# Patient Record
Sex: FEMALE | Race: BLACK OR AFRICAN AMERICAN | NOT HISPANIC OR LATINO | Employment: FULL TIME | URBAN - METROPOLITAN AREA
[De-identification: names, ages, dates, MRNs, and addresses within clinical notes are randomized per-mention and may not be internally consistent; named-entity substitution may affect disease eponyms.]

---

## 2017-08-11 ENCOUNTER — HOSPITAL ENCOUNTER (EMERGENCY)
Facility: HOSPITAL | Age: 27
Discharge: HOME | End: 2017-08-11
Attending: EMERGENCY MEDICINE
Payer: MEDICAID

## 2017-08-11 VITALS
DIASTOLIC BLOOD PRESSURE: 84 MMHG | WEIGHT: 218 LBS | TEMPERATURE: 98 F | HEIGHT: 65 IN | RESPIRATION RATE: 20 BRPM | HEART RATE: 80 BPM | SYSTOLIC BLOOD PRESSURE: 141 MMHG | BODY MASS INDEX: 36.32 KG/M2 | OXYGEN SATURATION: 98 %

## 2017-08-11 DIAGNOSIS — R10.9 LEFT SIDED ABDOMINAL PAIN: ICD-10-CM

## 2017-08-11 DIAGNOSIS — N20.0 NEPHROLITHIASIS: Primary | ICD-10-CM

## 2017-08-11 DIAGNOSIS — N13.30 HYDROURETERONEPHROSIS: ICD-10-CM

## 2017-08-11 DIAGNOSIS — R10.9 LEFT FLANK PAIN: ICD-10-CM

## 2017-08-11 LAB
ALBUMIN SERPL BCP-MCNC: 3.8 G/DL
ALP SERPL-CCNC: 89 U/L
ALT SERPL W/O P-5'-P-CCNC: 12 U/L
ANION GAP SERPL CALC-SCNC: 10 MMOL/L
AST SERPL-CCNC: 13 U/L
B-HCG UR QL: NEGATIVE
BACTERIA #/AREA URNS HPF: NORMAL /HPF
BASOPHILS # BLD AUTO: 0.01 K/UL
BASOPHILS NFR BLD: 0.1 %
BILIRUB SERPL-MCNC: 0.7 MG/DL
BILIRUB UR QL STRIP: NEGATIVE
BUN SERPL-MCNC: 9 MG/DL
CALCIUM SERPL-MCNC: 10.1 MG/DL
CHLORIDE SERPL-SCNC: 105 MMOL/L
CLARITY UR: ABNORMAL
CO2 SERPL-SCNC: 26 MMOL/L
COLOR UR: YELLOW
CREAT SERPL-MCNC: 1 MG/DL
CTP QC/QA: YES
DIFFERENTIAL METHOD: ABNORMAL
EOSINOPHIL # BLD AUTO: 0.1 K/UL
EOSINOPHIL NFR BLD: 0.6 %
ERYTHROCYTE [DISTWIDTH] IN BLOOD BY AUTOMATED COUNT: 12.9 %
EST. GFR  (AFRICAN AMERICAN): >60 ML/MIN/1.73 M^2
EST. GFR  (NON AFRICAN AMERICAN): >60 ML/MIN/1.73 M^2
GLUCOSE SERPL-MCNC: 111 MG/DL
GLUCOSE UR QL STRIP: NEGATIVE
HCT VFR BLD AUTO: 42.5 %
HGB BLD-MCNC: 13.5 G/DL
HGB UR QL STRIP: NEGATIVE
HYALINE CASTS #/AREA URNS LPF: 0 /LPF
KETONES UR QL STRIP: NEGATIVE
LEUKOCYTE ESTERASE UR QL STRIP: ABNORMAL
LYMPHOCYTES # BLD AUTO: 1.5 K/UL
LYMPHOCYTES NFR BLD: 13.6 %
MCH RBC QN AUTO: 27 PG
MCHC RBC AUTO-ENTMCNC: 31.8 G/DL
MCV RBC AUTO: 85 FL
MICROSCOPIC COMMENT: NORMAL
MONOCYTES # BLD AUTO: 0.7 K/UL
MONOCYTES NFR BLD: 6.1 %
NEUTROPHILS # BLD AUTO: 8.8 K/UL
NEUTROPHILS NFR BLD: 79.6 %
NITRITE UR QL STRIP: NEGATIVE
PH UR STRIP: 5 [PH] (ref 5–8)
PLATELET # BLD AUTO: 309 K/UL
PMV BLD AUTO: 10.8 FL
POTASSIUM SERPL-SCNC: 4 MMOL/L
PROT SERPL-MCNC: 7.6 G/DL
PROT UR QL STRIP: ABNORMAL
RBC # BLD AUTO: 5 M/UL
RBC #/AREA URNS HPF: 1 /HPF (ref 0–4)
SODIUM SERPL-SCNC: 141 MMOL/L
SP GR UR STRIP: 1.02 (ref 1–1.03)
SQUAMOUS #/AREA URNS HPF: 3 /HPF
URN SPEC COLLECT METH UR: ABNORMAL
UROBILINOGEN UR STRIP-ACNC: NEGATIVE EU/DL
WBC # BLD AUTO: 11.06 K/UL
WBC #/AREA URNS HPF: 1 /HPF (ref 0–5)

## 2017-08-11 PROCEDURE — 85025 COMPLETE CBC W/AUTO DIFF WBC: CPT

## 2017-08-11 PROCEDURE — 99284 EMERGENCY DEPT VISIT MOD MDM: CPT | Mod: 25

## 2017-08-11 PROCEDURE — 96375 TX/PRO/DX INJ NEW DRUG ADDON: CPT

## 2017-08-11 PROCEDURE — 96361 HYDRATE IV INFUSION ADD-ON: CPT

## 2017-08-11 PROCEDURE — 87086 URINE CULTURE/COLONY COUNT: CPT | Mod: 59

## 2017-08-11 PROCEDURE — 96374 THER/PROPH/DIAG INJ IV PUSH: CPT

## 2017-08-11 PROCEDURE — 87086 URINE CULTURE/COLONY COUNT: CPT

## 2017-08-11 PROCEDURE — 63600175 PHARM REV CODE 636 W HCPCS: Performed by: PHYSICIAN ASSISTANT

## 2017-08-11 PROCEDURE — 81025 URINE PREGNANCY TEST: CPT | Performed by: NURSE PRACTITIONER

## 2017-08-11 PROCEDURE — 80053 COMPREHEN METABOLIC PANEL: CPT

## 2017-08-11 PROCEDURE — 25000003 PHARM REV CODE 250: Performed by: PHYSICIAN ASSISTANT

## 2017-08-11 PROCEDURE — 81000 URINALYSIS NONAUTO W/SCOPE: CPT

## 2017-08-11 RX ORDER — TAMSULOSIN HYDROCHLORIDE 0.4 MG/1
0.4 CAPSULE ORAL
Status: COMPLETED | OUTPATIENT
Start: 2017-08-11 | End: 2017-08-11

## 2017-08-11 RX ORDER — MORPHINE SULFATE 10 MG/ML
6 INJECTION INTRAMUSCULAR; INTRAVENOUS; SUBCUTANEOUS
Status: COMPLETED | OUTPATIENT
Start: 2017-08-11 | End: 2017-08-11

## 2017-08-11 RX ORDER — KETOROLAC TROMETHAMINE 10 MG/1
10 TABLET, FILM COATED ORAL EVERY 6 HOURS
Qty: 30 TABLET | Refills: 0 | Status: SHIPPED | OUTPATIENT
Start: 2017-08-11 | End: 2018-07-29

## 2017-08-11 RX ORDER — KETOROLAC TROMETHAMINE 30 MG/ML
15 INJECTION, SOLUTION INTRAMUSCULAR; INTRAVENOUS
Status: COMPLETED | OUTPATIENT
Start: 2017-08-11 | End: 2017-08-11

## 2017-08-11 RX ORDER — ONDANSETRON 4 MG/1
4 TABLET, ORALLY DISINTEGRATING ORAL
Status: COMPLETED | OUTPATIENT
Start: 2017-08-11 | End: 2017-08-11

## 2017-08-11 RX ORDER — DICYCLOMINE HYDROCHLORIDE 10 MG/1
20 CAPSULE ORAL
Status: COMPLETED | OUTPATIENT
Start: 2017-08-11 | End: 2017-08-11

## 2017-08-11 RX ORDER — ONDANSETRON 2 MG/ML
4 INJECTION INTRAMUSCULAR; INTRAVENOUS
Status: COMPLETED | OUTPATIENT
Start: 2017-08-11 | End: 2017-08-11

## 2017-08-11 RX ORDER — TAMSULOSIN HYDROCHLORIDE 0.4 MG/1
0.4 CAPSULE ORAL DAILY
Qty: 10 CAPSULE | Refills: 0 | Status: SHIPPED | OUTPATIENT
Start: 2017-08-11 | End: 2018-07-29

## 2017-08-11 RX ADMIN — DICYCLOMINE HYDROCHLORIDE 20 MG: 10 CAPSULE ORAL at 02:08

## 2017-08-11 RX ADMIN — SODIUM CHLORIDE 1000 ML: 0.9 INJECTION, SOLUTION INTRAVENOUS at 02:08

## 2017-08-11 RX ADMIN — ONDANSETRON 4 MG: 4 TABLET, ORALLY DISINTEGRATING ORAL at 02:08

## 2017-08-11 RX ADMIN — ONDANSETRON 4 MG: 2 INJECTION INTRAMUSCULAR; INTRAVENOUS at 03:08

## 2017-08-11 RX ADMIN — MORPHINE SULFATE 6 MG: 10 INJECTION INTRAVENOUS at 03:08

## 2017-08-11 RX ADMIN — TAMSULOSIN HYDROCHLORIDE 0.4 MG: 0.4 CAPSULE ORAL at 03:08

## 2017-08-11 RX ADMIN — KETOROLAC TROMETHAMINE 15 MG: 30 INJECTION, SOLUTION INTRAMUSCULAR at 03:08

## 2017-08-11 NOTE — DISCHARGE INSTRUCTIONS
Take medications as prescribed.  Follow-up with urology for reevaluation of symptoms.  Return to this ED if any problems occur.

## 2017-08-11 NOTE — ED PROVIDER NOTES
"Encounter Date: 2017    SCRIBE #1 NOTE: I, David Watson II, am scribing for, and in the presence of,  Julio Khoury PA-C. I have scribed the following portions of the note - Other sections scribed: HPI and ROS.       History     Chief Complaint   Patient presents with    Flank Pain     left flank pain started this morning with nausea and diarhhea (3 times today)     26yr old female who denies PMHx, presents with left sided lower back pain and diarrhea since this am.  "Like the last time a kidney infection got on me".  (-) Fever, vomiting, SOB      CC: Flank Pain     HPI: This 26 y.o. female with no PMHx  presents to the ED c/o right sided abdominal pain with back pain and diarrhea that began this morning. Pt states she is unsure of what could have caused the abdominal pain. Pt was screaming with pain upon examination. Hx is limited due to acuity of condition.        The history is provided by the patient. No  was used.     Review of patient's allergies indicates:  No Known Allergies  No past medical history on file.  Past Surgical History:   Procedure Laterality Date     SECTION       No family history on file.  Social History   Substance Use Topics    Smoking status: Current Every Day Smoker     Packs/day: 0.50     Types: Cigarettes    Smokeless tobacco: Not on file    Alcohol use Yes      Comment: pt states drinks hard alcohol on occassion      Review of Systems   Constitutional: Negative for fever.   HENT: Negative for sore throat.    Respiratory: Negative for shortness of breath.    Cardiovascular: Negative for chest pain.   Gastrointestinal: Negative for nausea.   Genitourinary: Positive for flank pain. Negative for dysuria.   Musculoskeletal: Positive for back pain.   Skin: Negative for rash.   Neurological: Negative for weakness.   Hematological: Does not bruise/bleed easily.       Physical Exam     Initial Vitals   BP Pulse Resp Temp SpO2   -- -- -- -- --      MAP     "   --         Physical Exam    Nursing note and vitals reviewed.  Constitutional: She appears well-developed and well-nourished.   HENT:   Head: Normocephalic.   Eyes: Conjunctivae are normal.   Neck: Normal range of motion. Neck supple.   Cardiovascular: Regular rhythm and normal heart sounds. Tachycardia present.    Pulmonary/Chest: Breath sounds normal. No respiratory distress. She has no wheezes. She has no rhonchi. She has no rales. She exhibits no tenderness.   Abdominal: Soft. Bowel sounds are normal.   Abdomen overall soft, bowel sounds ×4.  TTP suprapubic and left lower quadrant.  TTP left flank, mild CVA tenderness.  No rebound.  Negative McBurney's, negative Padilla's.  Patient is voluntarily guarding.   Musculoskeletal: Normal range of motion.   Neurological: She is alert and oriented to person, place, and time.   Skin: Skin is warm and dry.         ED Course   Procedures  Labs Reviewed   URINALYSIS - Abnormal; Notable for the following:        Result Value    Appearance, UA Hazy (*)     Protein, UA 1+ (*)     Leukocytes, UA Trace (*)     All other components within normal limits   CBC W/ AUTO DIFFERENTIAL - Abnormal; Notable for the following:     MCHC 31.8 (*)     Gran # 8.8 (*)     Gran% 79.6 (*)     Lymph% 13.6 (*)     All other components within normal limits   COMPREHENSIVE METABOLIC PANEL - Abnormal; Notable for the following:     Glucose 111 (*)     All other components within normal limits   CULTURE, URINE   CULTURE, URINE   URINALYSIS MICROSCOPIC   POCT URINE PREGNANCY             Medical Decision Making:   Initial Assessment:   26-year-old female chief complaint acute onset abdominal pain, with associated nausea without vomiting, 3 episodes loose stools today.  No fever.  No dysuria.  No vaginal symptoms.  Differential Diagnosis:   UTI, cholecystitis, choledocholithiasis, cholelithiasis, nephrolithiasis, pyelonephritis, pancreatitis, gastritis  Clinical Tests:   Lab Tests: Ordered and  Reviewed  Radiological Study: Ordered and Reviewed  ED Management:  Patient overall well-appearing, in no acute distress, afebrile, vitals within normal limits.    Patient does exhibit tenderness to palpation of her suprapubic region, left lower quadrant, and left flank.  She is afebrile.  She admits to nausea without emesis.  She admits to 2 episodes loose stools, without melena or bright red blood per rectum.  She denies vaginal symptoms.  LMP 1 week ago.  UPT negative.  She denies vaginal discharge or vaginal bleeding.  She denies dysuria or hematuria.  She denies dysuria, hematuria, or difficulty with urination.  Differential this time includes UTI, STI, nephrolithiasis, pyelonephritis.    CBC without evidence of infection or anemia.  CMP without acute electrolyte abnormality.  Urinalysis without significant infection, however will culture.  CT abdomen/pelvis does show evidence of left sided hydroureteronephrosis.  There is 1-2 mm stone near the UVJ.  Without significant infection.  Blood work, and without significant urinary tract infection, I do feel patient is safe for discharge with Flomax, analgesia, follow-up with urology.  I given her prescription return precautions to return to this ED if she begins with fever or worsening pain.  Patient does understand and agree with treatment plan.   Other:   I have discussed this case with another health care provider.       <> Summary of the Discussion: I have discussed this case with Dr. Hussein.            Scribe Attestation:   Scribe #1: I performed the above scribed service and the documentation accurately describes the services I performed. I attest to the accuracy of the note.    Attending Attestation:     Physician Attestation Statement for NP/PA:   I discussed this assessment and plan of this patient with the NP/PA, but I did not personally examine the patient. The face to face encounter was performed by the NP/PA.        Physician Attestation for  Scribe:  Physician Attestation Statement for Scribe #1: I,  Julio Khoury PA-C, reviewed documentation, as scribed by David Watson II in my presence, and it is both accurate and complete.                 ED Course     Clinical Impression:   The primary encounter diagnosis was Nephrolithiasis. Diagnoses of Hydroureteronephrosis, Left sided abdominal pain, and Left flank pain were also pertinent to this visit.    Disposition:   Disposition: Discharged  Condition: Stable                        Julio Khoury PA-C  08/11/17 1650       Devyn Hussein MD  08/12/17 [id number]

## 2017-08-11 NOTE — ED TRIAGE NOTES
"Pt c/o left side pain that "woke me up out of my sleep". States pain is constant;describes as shooting pain; rates 10/10. Pt states she has had a kidney infection in the past-"this feels just like that".  "

## 2017-08-13 LAB
BACTERIA UR CULT: NORMAL
BACTERIA UR CULT: NORMAL

## 2018-07-29 ENCOUNTER — HOSPITAL ENCOUNTER (EMERGENCY)
Facility: HOSPITAL | Age: 28
Discharge: HOME | End: 2018-07-29
Attending: EMERGENCY MEDICINE
Payer: MEDICAID

## 2018-07-29 VITALS
WEIGHT: 212 LBS | BODY MASS INDEX: 35.32 KG/M2 | RESPIRATION RATE: 18 BRPM | HEART RATE: 72 BPM | DIASTOLIC BLOOD PRESSURE: 105 MMHG | TEMPERATURE: 98 F | SYSTOLIC BLOOD PRESSURE: 158 MMHG | OXYGEN SATURATION: 98 % | HEIGHT: 65 IN

## 2018-07-29 DIAGNOSIS — N94.6 MENSTRUAL CRAMPS: ICD-10-CM

## 2018-07-29 DIAGNOSIS — I10 HYPERTENSION, UNSPECIFIED TYPE: ICD-10-CM

## 2018-07-29 DIAGNOSIS — B96.89 BV (BACTERIAL VAGINOSIS): ICD-10-CM

## 2018-07-29 DIAGNOSIS — N76.0 BV (BACTERIAL VAGINOSIS): ICD-10-CM

## 2018-07-29 DIAGNOSIS — N93.9 VAGINAL BLEEDING: Primary | ICD-10-CM

## 2018-07-29 LAB
ABO + RH BLD: NORMAL
ALBUMIN SERPL BCP-MCNC: 3.9 G/DL
ALP SERPL-CCNC: 66 U/L
ALT SERPL W/O P-5'-P-CCNC: 17 U/L
ANION GAP SERPL CALC-SCNC: 12 MMOL/L
AST SERPL-CCNC: 22 U/L
B-HCG UR QL: NEGATIVE
BACTERIA #/AREA URNS HPF: ABNORMAL /HPF
BACTERIA GENITAL QL WET PREP: ABNORMAL
BASOPHILS # BLD AUTO: 0.02 K/UL
BASOPHILS NFR BLD: 0.2 %
BILIRUB SERPL-MCNC: 0.5 MG/DL
BILIRUB UR QL STRIP: NEGATIVE
BUN SERPL-MCNC: 12 MG/DL
CALCIUM SERPL-MCNC: 9.8 MG/DL
CHLORIDE SERPL-SCNC: 109 MMOL/L
CLARITY UR: ABNORMAL
CLUE CELLS VAG QL WET PREP: ABNORMAL
CO2 SERPL-SCNC: 21 MMOL/L
COLOR UR: YELLOW
CREAT SERPL-MCNC: 0.9 MG/DL
CTP QC/QA: YES
DIFFERENTIAL METHOD: ABNORMAL
EOSINOPHIL # BLD AUTO: 0.1 K/UL
EOSINOPHIL NFR BLD: 1.3 %
ERYTHROCYTE [DISTWIDTH] IN BLOOD BY AUTOMATED COUNT: 12.7 %
EST. GFR  (AFRICAN AMERICAN): >60 ML/MIN/1.73 M^2
EST. GFR  (NON AFRICAN AMERICAN): >60 ML/MIN/1.73 M^2
FILAMENT FUNGI VAG WET PREP-#/AREA: ABNORMAL
GLUCOSE SERPL-MCNC: 93 MG/DL
GLUCOSE UR QL STRIP: NEGATIVE
HCG INTACT+B SERPL-ACNC: <1.2 MIU/ML
HCT VFR BLD AUTO: 39.8 %
HGB BLD-MCNC: 12.6 G/DL
HGB UR QL STRIP: ABNORMAL
KETONES UR QL STRIP: NEGATIVE
LEUKOCYTE ESTERASE UR QL STRIP: ABNORMAL
LYMPHOCYTES # BLD AUTO: 1.8 K/UL
LYMPHOCYTES NFR BLD: 19.9 %
MCH RBC QN AUTO: 27.2 PG
MCHC RBC AUTO-ENTMCNC: 31.7 G/DL
MCV RBC AUTO: 86 FL
MICROSCOPIC COMMENT: ABNORMAL
MONOCYTES # BLD AUTO: 0.6 K/UL
MONOCYTES NFR BLD: 7 %
NEUTROPHILS # BLD AUTO: 6.3 K/UL
NEUTROPHILS NFR BLD: 71.4 %
NITRITE UR QL STRIP: NEGATIVE
PH UR STRIP: 5 [PH] (ref 5–8)
PLATELET # BLD AUTO: 306 K/UL
PMV BLD AUTO: 10.8 FL
POTASSIUM SERPL-SCNC: 4.7 MMOL/L
PROT SERPL-MCNC: 7.7 G/DL
PROT UR QL STRIP: NEGATIVE
RBC # BLD AUTO: 4.64 M/UL
RBC #/AREA URNS HPF: 30 /HPF (ref 0–4)
SODIUM SERPL-SCNC: 142 MMOL/L
SP GR UR STRIP: 1.02 (ref 1–1.03)
SPECIMEN SOURCE: ABNORMAL
SQUAMOUS #/AREA URNS HPF: 10 /HPF
T VAGINALIS GENITAL QL WET PREP: ABNORMAL
URN SPEC COLLECT METH UR: ABNORMAL
UROBILINOGEN UR STRIP-ACNC: NEGATIVE EU/DL
WBC # BLD AUTO: 8.8 K/UL
WBC #/AREA URNS HPF: 50 /HPF (ref 0–5)
WBC #/AREA VAG WET PREP: ABNORMAL
YEAST GENITAL QL WET PREP: ABNORMAL

## 2018-07-29 PROCEDURE — 87186 SC STD MICRODIL/AGAR DIL: CPT

## 2018-07-29 PROCEDURE — 87210 SMEAR WET MOUNT SALINE/INK: CPT

## 2018-07-29 PROCEDURE — 84702 CHORIONIC GONADOTROPIN TEST: CPT

## 2018-07-29 PROCEDURE — 87086 URINE CULTURE/COLONY COUNT: CPT

## 2018-07-29 PROCEDURE — 87077 CULTURE AEROBIC IDENTIFY: CPT

## 2018-07-29 PROCEDURE — 81000 URINALYSIS NONAUTO W/SCOPE: CPT

## 2018-07-29 PROCEDURE — 81025 URINE PREGNANCY TEST: CPT | Performed by: NURSE PRACTITIONER

## 2018-07-29 PROCEDURE — 87088 URINE BACTERIA CULTURE: CPT

## 2018-07-29 PROCEDURE — 80053 COMPREHEN METABOLIC PANEL: CPT

## 2018-07-29 PROCEDURE — 87491 CHLMYD TRACH DNA AMP PROBE: CPT

## 2018-07-29 PROCEDURE — 99284 EMERGENCY DEPT VISIT MOD MDM: CPT | Mod: 25

## 2018-07-29 PROCEDURE — 85025 COMPLETE CBC W/AUTO DIFF WBC: CPT

## 2018-07-29 PROCEDURE — 86901 BLOOD TYPING SEROLOGIC RH(D): CPT

## 2018-07-29 RX ORDER — METRONIDAZOLE 500 MG/1
500 TABLET ORAL EVERY 12 HOURS
Qty: 14 TABLET | Refills: 0 | Status: SHIPPED | OUTPATIENT
Start: 2018-07-29 | End: 2018-08-05

## 2018-07-29 RX ORDER — ETODOLAC 300 MG/1
300 CAPSULE ORAL 2 TIMES DAILY
Qty: 10 CAPSULE | Refills: 0 | Status: SHIPPED | OUTPATIENT
Start: 2018-07-29

## 2018-07-29 RX ORDER — IBUPROFEN 600 MG/1
600 TABLET ORAL
Status: DISCONTINUED | OUTPATIENT
Start: 2018-07-29 | End: 2018-07-29 | Stop reason: HOSPADM

## 2018-07-29 NOTE — ED TRIAGE NOTES
Pt states she has irregular periods, spotted in the month of June and didn't have a full cycle. She took 3 home pregnancy test, 2 were positive and 1 was negative. Today she started to have abdominal pain, went to the bathroom, noted bright red blood on the toilet paper. Denies a vaginal discharge.

## 2018-07-29 NOTE — ED PROVIDER NOTES
"Encounter Date: 2018     This is a 27 y.o. female complaining of vaginal bleeding that began less than an hour ago. Positive UPT on . Reports associated lower abdominal cramping similar to menstrual cramps. No prenatal care.    I have evaluated and conducted a medical screening exam with initial orders entered, if indicated, to expedite care. The patient will be placed in a treatment area when one is available. Care will be transferred to an alternate provider for a full assessment including but not limited to: history, physical exam, additional orders, and final disposition.    Davin Alex NP         History     Chief Complaint   Patient presents with    Female  Problem     "I had 2 positive preg tests last month. I started bleeding 15 min ago. I don't know if it's spotting or my period." Also c/o cramping     This is a 27-year-old female who complains of vaginal bleeding today.  She also reports mild pelvic pain, cramping, similar to menstrual cramps, but reports having 2 positive pregnancy test last month.  Her LMP was 2nd half of May.  She reports vaginal spotting in mid , much less than a regular period.  She took 3 pregnancy test because of the spotting, to which were positive. She made an appointment with OBGYN for initial prenatal care, started taking prenatal vitamins, and is awaiting that appointment. She has had 2 miscarriages and one elected , and has 1 child. She denies discharge, odor, fever, dysuria.          Review of patient's allergies indicates:  No Known Allergies  History reviewed. No pertinent past medical history.  Past Surgical History:   Procedure Laterality Date     SECTION       Family History   Problem Relation Age of Onset    Hypertension Mother     Hypertension Father     Hypertension Maternal Uncle     Diabetes Maternal Uncle      Social History   Substance Use Topics    Smoking status: Never Smoker    Smokeless tobacco: Never Used    Alcohol " use Yes      Comment: occ     Review of Systems   Constitutional: Negative for fever.   HENT: Negative for sore throat.    Respiratory: Negative for shortness of breath.    Cardiovascular: Negative for chest pain.   Gastrointestinal: Negative for nausea.   Genitourinary: Positive for pelvic pain and vaginal bleeding. Negative for dysuria.   Musculoskeletal: Negative for back pain.   Skin: Negative for rash.   Neurological: Negative for weakness.   Hematological: Does not bruise/bleed easily.       Physical Exam     Initial Vitals [07/29/18 1332]   BP Pulse Resp Temp SpO2   (!) 189/93 82 16 99 °F (37.2 °C) 100 %      MAP       --         Physical Exam    Vitals reviewed.  Constitutional: She appears well-developed and well-nourished. She is not diaphoretic. No distress.   HENT:   Head: Normocephalic and atraumatic.   Right Ear: External ear normal.   Left Ear: External ear normal.   Nose: Nose normal.   Eyes: Conjunctivae are normal. No scleral icterus.   Neck: Normal range of motion. Neck supple.   Cardiovascular: Normal rate, regular rhythm, normal heart sounds and intact distal pulses.   Pulmonary/Chest: Breath sounds normal. No respiratory distress. She has no wheezes. She has no rhonchi. She has no rales. She exhibits no tenderness.   Abdominal: Soft. She exhibits no distension and no mass. There is no tenderness. There is no rebound and no guarding.   Genitourinary: Cervix exhibits no motion tenderness, no discharge and no friability. Right adnexum displays no mass and no tenderness. Left adnexum displays no mass and no tenderness. There is tenderness and bleeding in the vagina. No erythema in the vagina. No foreign body in the vagina. No signs of injury around the vagina. No vaginal discharge found.   Genitourinary Comments: Pelvic exam chaperoned by Sarah GILLIAM   Musculoskeletal: Normal range of motion. She exhibits no edema.   Lymphadenopathy:     She has no cervical adenopathy.   Neurological: She is alert  and oriented to person, place, and time.   Skin: Skin is warm and dry.         ED Course   Procedures  Labs Reviewed   CBC W/ AUTO DIFFERENTIAL - Abnormal; Notable for the following:        Result Value    MCHC 31.7 (*)     All other components within normal limits   COMPREHENSIVE METABOLIC PANEL - Abnormal; Notable for the following:     CO2 21 (*)     All other components within normal limits   URINALYSIS, REFLEX TO URINE CULTURE - Abnormal; Notable for the following:     Appearance, UA Hazy (*)     Occult Blood UA 3+ (*)     Leukocytes, UA 2+ (*)     All other components within normal limits    Narrative:     Preferred Collection Type->Urine, Clean Catch   URINALYSIS MICROSCOPIC - Abnormal; Notable for the following:     RBC, UA 30 (*)     WBC, UA 50 (*)     Bacteria, UA Few (*)     All other components within normal limits    Narrative:     Preferred Collection Type->Urine, Clean Catch   VAGINAL SCREEN - Abnormal; Notable for the following:     Clue Cells, Wet Prep Few (*)     WBC - Vaginal Screen Occasional (*)     Bacteria - Vaginal Screen Occasional (*)     All other components within normal limits   CULTURE, URINE   C. TRACHOMATIS/N. GONORRHOEAE BY AMP DNA   HCG, QUANTITATIVE, PREGNANCY   POCT URINE PREGNANCY   GROUP & RH          Imaging Results    None          Medical Decision Making:   ED Management:  Patient with history and exam consistent with menstrual cycle with menstrual cramps.  The UPT is negative, quantitative HCG negative. UA with evidence for infection versus contamination.  Patient with no dysuria or urinary symptoms.  Will follow up with culture.  Wet prep with clue cells.  Will offer treatment for BV.  Low suspicion for PID.  Will follow up with gonorrhea and chlamydia.  Patient likely having return of menstrual cycle, after possible early miscarriage.  She was instructed to follow up with OBGYN for further evaluation of multiple miscarriages and follow-up care for current complaint.   Patient also with elevated blood pressure in the ED.  She has only had gestational blood pressure, which resolved after pregnancy.  Currently no headache, chest pain, shortness of breath, or other symptoms or exam findings to suggest end-organ damage.  Blood pressure improved prior to discharge. Will have patient follow up with PCP for re-evaluation of blood pressure.   She verbalized understanding and agreed with plan.                      Clinical Impression:   The primary encounter diagnosis was Vaginal bleeding. Diagnoses of Menstrual cramps, BV (bacterial vaginosis), and Hypertension, unspecified type were also pertinent to this visit.                             Javier Vieira PA-C  07/29/18 1532       Javier Vieira PA-C  07/29/18 1605

## 2018-07-30 LAB
C TRACH DNA SPEC QL NAA+PROBE: NOT DETECTED
N GONORRHOEA DNA SPEC QL NAA+PROBE: NOT DETECTED

## 2018-07-31 LAB — BACTERIA UR CULT: NORMAL

## 2018-08-01 ENCOUNTER — TELEPHONE (OUTPATIENT)
Dept: EMERGENCY MEDICINE | Facility: HOSPITAL | Age: 28
End: 2018-08-01

## 2018-08-01 RX ORDER — NITROFURANTOIN 25; 75 MG/1; MG/1
100 CAPSULE ORAL 2 TIMES DAILY
Qty: 10 CAPSULE | Refills: 0 | Status: SHIPPED | OUTPATIENT
Start: 2018-08-01 | End: 2018-08-06

## 2018-08-06 ENCOUNTER — HOSPITAL ENCOUNTER (EMERGENCY)
Facility: HOSPITAL | Age: 28
Discharge: HOME | End: 2018-08-06
Attending: EMERGENCY MEDICINE
Payer: MEDICAID

## 2018-08-06 VITALS
RESPIRATION RATE: 16 BRPM | HEIGHT: 63 IN | OXYGEN SATURATION: 100 % | TEMPERATURE: 99 F | DIASTOLIC BLOOD PRESSURE: 98 MMHG | BODY MASS INDEX: 38.8 KG/M2 | WEIGHT: 219 LBS | SYSTOLIC BLOOD PRESSURE: 168 MMHG | HEART RATE: 98 BPM

## 2018-08-06 DIAGNOSIS — R10.30 LOWER ABDOMINAL PAIN: Primary | ICD-10-CM

## 2018-08-06 DIAGNOSIS — R10.9 RIGHT FLANK PAIN: ICD-10-CM

## 2018-08-06 LAB
ALBUMIN SERPL BCP-MCNC: 3.8 G/DL
ALP SERPL-CCNC: 72 U/L
ALT SERPL W/O P-5'-P-CCNC: 15 U/L
ANION GAP SERPL CALC-SCNC: 13 MMOL/L
AST SERPL-CCNC: 28 U/L
B-HCG UR QL: NEGATIVE
BACTERIA #/AREA URNS HPF: ABNORMAL /HPF
BACTERIA GENITAL QL WET PREP: ABNORMAL
BASOPHILS # BLD AUTO: 0.01 K/UL
BASOPHILS NFR BLD: 0.1 %
BILIRUB SERPL-MCNC: 0.8 MG/DL
BILIRUB UR QL STRIP: NEGATIVE
BUN SERPL-MCNC: 10 MG/DL
CALCIUM SERPL-MCNC: 10 MG/DL
CHLORIDE SERPL-SCNC: 105 MMOL/L
CLARITY UR: CLEAR
CLUE CELLS VAG QL WET PREP: ABNORMAL
CO2 SERPL-SCNC: 21 MMOL/L
COLOR UR: YELLOW
CREAT SERPL-MCNC: 0.9 MG/DL
CTP QC/QA: YES
DIFFERENTIAL METHOD: ABNORMAL
EOSINOPHIL # BLD AUTO: 0.1 K/UL
EOSINOPHIL NFR BLD: 0.9 %
ERYTHROCYTE [DISTWIDTH] IN BLOOD BY AUTOMATED COUNT: 13.1 %
EST. GFR  (AFRICAN AMERICAN): >60 ML/MIN/1.73 M^2
EST. GFR  (NON AFRICAN AMERICAN): >60 ML/MIN/1.73 M^2
FILAMENT FUNGI VAG WET PREP-#/AREA: ABNORMAL
GLUCOSE SERPL-MCNC: 96 MG/DL
GLUCOSE UR QL STRIP: NEGATIVE
HCT VFR BLD AUTO: 42.3 %
HGB BLD-MCNC: 13.4 G/DL
HGB UR QL STRIP: NEGATIVE
HYALINE CASTS #/AREA URNS LPF: 6 /LPF
KETONES UR QL STRIP: NEGATIVE
LEUKOCYTE ESTERASE UR QL STRIP: ABNORMAL
LYMPHOCYTES # BLD AUTO: 1.8 K/UL
LYMPHOCYTES NFR BLD: 15.2 %
MCH RBC QN AUTO: 26.8 PG
MCHC RBC AUTO-ENTMCNC: 31.7 G/DL
MCV RBC AUTO: 85 FL
MICROSCOPIC COMMENT: ABNORMAL
MONOCYTES # BLD AUTO: 1 K/UL
MONOCYTES NFR BLD: 8.8 %
NEUTROPHILS # BLD AUTO: 8.7 K/UL
NEUTROPHILS NFR BLD: 74.7 %
NITRITE UR QL STRIP: NEGATIVE
NON-SQ EPI CELLS #/AREA URNS HPF: 1 /HPF
PH UR STRIP: 5 [PH] (ref 5–8)
PLATELET # BLD AUTO: 293 K/UL
PMV BLD AUTO: 10.9 FL
POTASSIUM SERPL-SCNC: 5 MMOL/L
PROT SERPL-MCNC: 8 G/DL
PROT UR QL STRIP: ABNORMAL
RBC # BLD AUTO: 5 M/UL
RBC #/AREA URNS HPF: 1 /HPF (ref 0–4)
SODIUM SERPL-SCNC: 139 MMOL/L
SP GR UR STRIP: 1.02 (ref 1–1.03)
SPECIMEN SOURCE: ABNORMAL
SQUAMOUS #/AREA URNS HPF: 4 /HPF
T VAGINALIS GENITAL QL WET PREP: ABNORMAL
URN SPEC COLLECT METH UR: ABNORMAL
UROBILINOGEN UR STRIP-ACNC: ABNORMAL EU/DL
WBC # BLD AUTO: 11.69 K/UL
WBC #/AREA URNS HPF: 15 /HPF (ref 0–5)
WBC #/AREA VAG WET PREP: ABNORMAL
YEAST GENITAL QL WET PREP: ABNORMAL

## 2018-08-06 PROCEDURE — 25000003 PHARM REV CODE 250: Performed by: PHYSICIAN ASSISTANT

## 2018-08-06 PROCEDURE — 87186 SC STD MICRODIL/AGAR DIL: CPT

## 2018-08-06 PROCEDURE — 63600175 PHARM REV CODE 636 W HCPCS: Performed by: PHYSICIAN ASSISTANT

## 2018-08-06 PROCEDURE — 96360 HYDRATION IV INFUSION INIT: CPT

## 2018-08-06 PROCEDURE — 87086 URINE CULTURE/COLONY COUNT: CPT

## 2018-08-06 PROCEDURE — 87077 CULTURE AEROBIC IDENTIFY: CPT

## 2018-08-06 PROCEDURE — 80053 COMPREHEN METABOLIC PANEL: CPT

## 2018-08-06 PROCEDURE — 99284 EMERGENCY DEPT VISIT MOD MDM: CPT | Mod: 25

## 2018-08-06 PROCEDURE — 87088 URINE BACTERIA CULTURE: CPT

## 2018-08-06 PROCEDURE — 96372 THER/PROPH/DIAG INJ SC/IM: CPT | Mod: 59

## 2018-08-06 PROCEDURE — 81000 URINALYSIS NONAUTO W/SCOPE: CPT

## 2018-08-06 PROCEDURE — 81025 URINE PREGNANCY TEST: CPT | Performed by: NURSE PRACTITIONER

## 2018-08-06 PROCEDURE — 85025 COMPLETE CBC W/AUTO DIFF WBC: CPT

## 2018-08-06 PROCEDURE — 87491 CHLMYD TRACH DNA AMP PROBE: CPT

## 2018-08-06 PROCEDURE — 87210 SMEAR WET MOUNT SALINE/INK: CPT

## 2018-08-06 PROCEDURE — 96361 HYDRATE IV INFUSION ADD-ON: CPT

## 2018-08-06 RX ORDER — AZITHROMYCIN 250 MG/1
1000 TABLET, FILM COATED ORAL
Status: COMPLETED | OUTPATIENT
Start: 2018-08-06 | End: 2018-08-06

## 2018-08-06 RX ORDER — ONDANSETRON 4 MG/1
4 TABLET, ORALLY DISINTEGRATING ORAL EVERY 6 HOURS PRN
Qty: 30 TABLET | Refills: 0 | Status: SHIPPED | OUTPATIENT
Start: 2018-08-06

## 2018-08-06 RX ORDER — CEFTRIAXONE 1 G/1
250 INJECTION, POWDER, FOR SOLUTION INTRAMUSCULAR; INTRAVENOUS
Status: COMPLETED | OUTPATIENT
Start: 2018-08-06 | End: 2018-08-06

## 2018-08-06 RX ORDER — DEXTROMETHORPHAN HYDROBROMIDE, GUAIFENESIN 5; 100 MG/5ML; MG/5ML
650 LIQUID ORAL 3 TIMES DAILY PRN
Qty: 20 TABLET | Refills: 0 | Status: SHIPPED | OUTPATIENT
Start: 2018-08-06

## 2018-08-06 RX ORDER — CEPHALEXIN 500 MG/1
1000 CAPSULE ORAL 3 TIMES DAILY
Qty: 84 CAPSULE | Refills: 0 | Status: SHIPPED | OUTPATIENT
Start: 2018-08-06 | End: 2018-08-20

## 2018-08-06 RX ORDER — LIDOCAINE HYDROCHLORIDE 10 MG/ML
5 INJECTION INFILTRATION; PERINEURAL
Status: COMPLETED | OUTPATIENT
Start: 2018-08-06 | End: 2018-08-06

## 2018-08-06 RX ADMIN — CEFTRIAXONE SODIUM 250 MG: 1 INJECTION, POWDER, FOR SOLUTION INTRAMUSCULAR; INTRAVENOUS at 07:08

## 2018-08-06 RX ADMIN — AZITHROMYCIN MONOHYDRATE 1000 MG: 250 TABLET ORAL at 07:08

## 2018-08-06 RX ADMIN — LIDOCAINE HYDROCHLORIDE 5 ML: 10 INJECTION, SOLUTION INFILTRATION; PERINEURAL at 07:08

## 2018-08-06 RX ADMIN — SODIUM CHLORIDE 1000 ML: 0.9 INJECTION, SOLUTION INTRAVENOUS at 06:08

## 2018-08-06 NOTE — ED TRIAGE NOTES
Pt c/o lower back pain, lower abdominal pain. Pt was seen in ER on 7/29 for vaginal bleeding, cramping and told she may have had an early miscarriage (UPT was negative at that time). Pain became 10/10 this morning. Denies N/V/D.

## 2018-08-06 NOTE — ED PROVIDER NOTES
"Encounter Date: 2018      This is an initial triage evaluation of Sneha Rosen, a 27 y.o., female  that presents to the Emergency Department with c/o abd pain // "like when I had a miscarriage"     Pertinent exam findings: tearful    Orders Pending : ua / hcg    Destination: q track    I have evaluated and provided a medical screening exam with initial orders placed, if indicated, to expedite care. The patient is stable to return to the waiting area and will be placed in a treatment area when one is available. Care will be transferred to an alternate provider when patient is roomed from the lobby for full assessment including: history, physical exam, additional orders, and final disposition.      SAYRA Rendon-SHAYY     History   No chief complaint on file.    28yo F with pmh HTN, nephrolithiasis, presents to ED with chief complaint lower abdominal pain x 5 days. Pt recently seen in this ED due to vaginal bleeding and suspicion for pregnancy. UPT and hcg negative at that time. Pt discharged with macrobid to treat BV. Pt contacted a few days later due to positive urine culture, currently taking Macrobid as prescribed. Pt states began with constant, cramping abdominal pain to lower abdomen, worsened over 5 days. No radiation of pain. No n/v. No fever. No change in bowel habits. LMP 18. She admits to infrequent spotting since that time. No vaginal discharge. No alleviating/exacerbating factors.           Review of patient's allergies indicates:  No Known Allergies  No past medical history on file.  Past Surgical History:   Procedure Laterality Date     SECTION       Family History   Problem Relation Age of Onset    Hypertension Mother     Hypertension Father     Hypertension Maternal Uncle     Diabetes Maternal Uncle      Social History   Substance Use Topics    Smoking status: Never Smoker    Smokeless tobacco: Never Used    Alcohol use Yes      Comment: occ     Review of Systems "   Constitutional: Negative for chills and fever.   HENT: Negative for sore throat.    Eyes: Negative.    Respiratory: Negative for shortness of breath.    Cardiovascular: Negative for chest pain.   Gastrointestinal: Positive for abdominal pain. Negative for nausea and vomiting.   Endocrine: Negative.    Genitourinary: Positive for vaginal bleeding (spotting). Negative for difficulty urinating, dysuria, flank pain, frequency, hematuria, vaginal discharge and vaginal pain.   Musculoskeletal: Negative for back pain, neck pain and neck stiffness.   Skin: Negative for rash.   Neurological: Negative for weakness and headaches.   Hematological: Does not bruise/bleed easily.   Psychiatric/Behavioral: Negative.    All other systems reviewed and are negative.      Physical Exam     Initial Vitals [08/06/18 1807]   BP Pulse Resp Temp SpO2   (!) 174/105 104 18 98.9 °F (37.2 °C) --      MAP       --         Physical Exam    Nursing note and vitals reviewed.  Constitutional: She appears well-developed and well-nourished. She is not diaphoretic. No distress.   HENT:   Head: Normocephalic and atraumatic.   Eyes: Conjunctivae and EOM are normal. Pupils are equal, round, and reactive to light.   Neck: Normal range of motion. Neck supple. No tracheal deviation present.   Cardiovascular: Intact distal pulses.   Pulmonary/Chest: Breath sounds normal. No stridor. No respiratory distress. She has no wheezes.   Abdominal:   Abdomen overall soft, normal bowel sounds ×4.  TTP suprapubic, LLQ, RLQ. There is pain to R flank and CVA region. No rebound or guarding.  No palpable mass or distention. Negative Padilla sign.  +pain over McBurney's point.   Genitourinary:   Genitourinary Comments: No vesicular lesions or open sores to external genitalia. No significant discharge, gross bleeding, tissue within vaginal vault. No adnexal mass or ttp. Cervix os closed. No cervix erythema/friability. No CMT. Moderate purulent d/c from os. No  bleeding/tissue.   Musculoskeletal: Normal range of motion. She exhibits no tenderness.   Lymphadenopathy:     She has no cervical adenopathy.   Neurological: She is alert and oriented to person, place, and time.   Skin: Skin is warm and dry. Capillary refill takes less than 2 seconds.   Psychiatric: She has a normal mood and affect. Her behavior is normal. Judgment and thought content normal.         ED Course   Procedures  Labs Reviewed   URINALYSIS - Abnormal; Notable for the following:        Result Value    Protein, UA 1+ (*)     Urobilinogen, UA 2.0-3.0 (*)     Leukocytes, UA Trace (*)     All other components within normal limits   CBC W/ AUTO DIFFERENTIAL - Abnormal; Notable for the following:     MCH 26.8 (*)     MCHC 31.7 (*)     Gran # (ANC) 8.7 (*)     Gran% 74.7 (*)     Lymph% 15.2 (*)     All other components within normal limits   COMPREHENSIVE METABOLIC PANEL - Abnormal; Notable for the following:     CO2 21 (*)     All other components within normal limits   VAGINAL SCREEN - Abnormal; Notable for the following:     WBC - Vaginal Screen Rare (*)     Bacteria - Vaginal Screen Few (*)     All other components within normal limits   URINALYSIS MICROSCOPIC - Abnormal; Notable for the following:     WBC, UA 15 (*)     Bacteria, UA Many (*)     Non-Squam Epith 1 (*)     Hyaline Casts, UA 6 (*)     All other components within normal limits   CULTURE, URINE   C. TRACHOMATIS/N. GONORRHOEAE BY AMP DNA   POCT URINE PREGNANCY          Imaging Results          CT Renal Stone Study ABD Pelvis WO (Final result)  Result time 08/06/18 19:24:41    Final result by Brodie Dick MD (08/06/18 19:24:41)                 Impression:      No acute process or CT findings identified to explain patient's symptoms of lower abdominal pain on this noncontrast CT.  Specifically, no evidence of appendicitis or radiodense nephrolithiasis or ureteral calculus.    Cholelithiasis.      Electronically signed by: Brodie Dick  MD  Date:    08/06/2018  Time:    19:24             Narrative:    EXAMINATION:  CT RENAL STONE STUDY ABD PELVIS WO    CLINICAL HISTORY:  lower abdominal pain; d/d appendicitis, R nephrolithiasis/pyelo;    TECHNIQUE:  Low dose axial images, sagittal and coronal reformations were obtained from the lung bases to the pubic symphysis.  Contrast was not administered.    COMPARISON:  CT abdomen and pelvis 08/11/2017    FINDINGS:  Included lung bases show minimal dependent atelectasis.  Base of the heart is within normal limits.    Cholelithiasis without CT evidence of acute cholecystitis.  Noncontrast appearance of the liver, pancreas, spleen, stomach, duodenum and bilateral adrenal glands are within normal limits.  No biliary ductal dilatation.    Bilateral kidneys are normal in size, shape and location.  No radiodense calculus seen within the collecting systems on either side or urinary bladder.  No hydronephrosis or significant perinephric stranding.  Urinary bladder is collapsed.  Noncontrast appearance of the uterus and bilateral adnexal regions are within normal limits.  Pelvic phleboliths noted.    No ascites, free air or lymphadenopathy.  Aorta is within normal limits.    Tiny fat containing umbilical hernia.  Appendix and terminal ileum are within normal limits.  No evidence of bowel obstruction or inflammation.  No pneumatosis or portal venous gas.    Small focus of subcutaneous gas at the right gluteal region likely related to recent injection.  Included osseous structures appear stable without acute process seen.                                 Medical Decision Making:   ED Management:  28yo F with worsening lower abdominal pain x 5 days. Currently taking macrobid for positive urine culture 8 days ago. PE with lower abdominal and R flank ttp. Hx nephrolithiasis. D/D includes UTI, STI, PID, appendicitis, nephrolithiasis, pyelonephritis. Pelvic with evidence of cervicitis. Pt has had sexual intercourse since last  being swabbed. Will empirically treat. No adnexal mass or ttp. Low suspicion for TOA or torsion. Abdominal pain not unilateral. I do not think this represents peritoneal signs. Urinalysis with infection. Culture previously pan-sensitive. May represent pyelo given new R flank pain. Again, pain without predominance for RLQ. Given 5 day hx, low suspicion for appendicitis or rupture. CT without hydronephrosis, nephrolithiasis, or appendicitis. Will treat as pyelo on d/c, have pt f/u with OB for cervicitis, have pt f/u with PCP for pyelo management. Return precautions given.   Other:   I have discussed this case with another health care provider.       <> Summary of the Discussion: Dr. Ibrahim                      Clinical Impression:   The primary encounter diagnosis was Lower abdominal pain. A diagnosis of Right flank pain was also pertinent to this visit.      Disposition:   Disposition: Discharged  Condition: Stable                        Julio Khoury PA-C  08/07/18 0213

## 2018-08-07 LAB
C TRACH DNA SPEC QL NAA+PROBE: NOT DETECTED
N GONORRHOEA DNA SPEC QL NAA+PROBE: NOT DETECTED

## 2018-08-07 NOTE — DISCHARGE INSTRUCTIONS
Take all antibiotics as prescribed.  Try to take with meals to limit nausea.  Zofran for nausea.  Follow up with primary care physician for re-evaluation and further recommendations.  Follow-up with OB-GYN for reevaluation of cervix discharge. Abstain from sexual intercourse until you see your OB-GYN. Return to this ED if any other problems occur.

## 2018-08-08 LAB — BACTERIA UR CULT: NORMAL

## 2020-01-01 NOTE — ED NOTES
Pt c/o increasing abdominal pain and currently vomiting. PAKO Kim informed.   Follow up with your pediatrician within 48 hours of discharge.
